# Patient Record
(demographics unavailable — no encounter records)

---

## 2024-10-24 NOTE — DISCUSSION/SUMMARY
[Medication Risks Reviewed] : Medication risks reviewed [de-identified] : He will continue to rest and restrict his activities.  He will finish the diclofenac 75 mg twice a day and then lower the dose to 50 mg twice a day.  He will call if there are problems with the medication or worsening of his symptoms and I will see him for follow-up in 4 weeks.

## 2024-10-24 NOTE — REASON FOR VISIT
[Degenerative Joint Disease] : degenerative joint disease [Back Pain] : back pain [Radiculopathy] : radiculopathy

## 2024-10-24 NOTE — PHYSICAL EXAM
[de-identified] : He is comfortable today and straight leg raising is negative to 90 degrees bilaterally.

## 2024-10-24 NOTE — HISTORY OF PRESENT ILLNESS
[de-identified] : He was first seen in late July with constant back pain graded as a 10 with radiation down the left leg constant and graded as 6 or 7.  He was started on ibuprofen and after 3 to 4 weeks the symptoms were intermittent and about 50% better.  He continued the ibuprofen but made no significant sequential progress and was switched to diclofenac.  At the time of the last visit the symptoms in the back were intermittent and somewhere between a discomfort and a mild pain in the left leg pain was a discomfort or an ache on an intermittent basis.  He he was placed on a 10-day prednisone taper and the leg symptoms have fully resolved and the back is now only an intermittent discomfort.